# Patient Record
Sex: FEMALE | Race: WHITE | URBAN - METROPOLITAN AREA
[De-identification: names, ages, dates, MRNs, and addresses within clinical notes are randomized per-mention and may not be internally consistent; named-entity substitution may affect disease eponyms.]

---

## 2021-07-27 ENCOUNTER — HOSPITAL ENCOUNTER (EMERGENCY)
Facility: HOSPITAL | Age: 31
Discharge: HOME | End: 2021-07-27
Attending: PEDIATRICS
Payer: COMMERCIAL

## 2021-07-27 ENCOUNTER — APPOINTMENT (OUTPATIENT)
Dept: GENERAL RADIOLOGY | Facility: HOSPITAL | Age: 31
End: 2021-07-27
Attending: PEDIATRICS
Payer: COMMERCIAL

## 2021-07-27 VITALS
SYSTOLIC BLOOD PRESSURE: 133 MMHG | OXYGEN SATURATION: 100 % | HEIGHT: 66 IN | DIASTOLIC BLOOD PRESSURE: 90 MMHG | HEART RATE: 77 BPM | RESPIRATION RATE: 20 BRPM | BODY MASS INDEX: 25.71 KG/M2 | WEIGHT: 160 LBS

## 2021-07-27 DIAGNOSIS — S93.401A MILD ANKLE SPRAIN, RIGHT, INITIAL ENCOUNTER: Primary | ICD-10-CM

## 2021-07-27 PROCEDURE — 99283 EMERGENCY DEPT VISIT LOW MDM: CPT

## 2021-07-27 PROCEDURE — 73610 X-RAY EXAM OF ANKLE: CPT | Performed by: PEDIATRICS

## 2021-07-27 NOTE — ED PROVIDER NOTES
Patient Seen in: [facility] Emergency Department      History   Patient presents with:  Leg or Foot Injury    Stated Complaint: R ankle injury, denies hitting head, fall     HPI/Subjective:   HPI    25-year-old female here with right ankle injury th normal.   Musculoskeletal:         General: Signs of injury present. No swelling or tenderness. Cervical back: Normal range of motion and neck supple. Comments: No tenderness over right lateral ankle over malleolus or ligaments.   No foot tenderne none relevant for this visit           [facility]      ASSESSMENT & PLAN:    26year old female with ankle injury. X-rays negative for fracture. Diagnosis of ankle sprain.   Home with supportive care    I have considered other serious etiologies for this patient